# Patient Record
Sex: FEMALE | URBAN - METROPOLITAN AREA
[De-identification: names, ages, dates, MRNs, and addresses within clinical notes are randomized per-mention and may not be internally consistent; named-entity substitution may affect disease eponyms.]

---

## 2018-09-13 ENCOUNTER — APPOINTMENT (RX ONLY)
Dept: URBAN - METROPOLITAN AREA CLINIC 123 | Facility: CLINIC | Age: 28
Setting detail: DERMATOLOGY
End: 2018-09-13

## 2018-09-13 DIAGNOSIS — L20.89 OTHER ATOPIC DERMATITIS: ICD-10-CM

## 2018-09-13 PROBLEM — L20.84 INTRINSIC (ALLERGIC) ECZEMA: Status: ACTIVE | Noted: 2018-09-13

## 2018-09-13 PROBLEM — F32.9 MAJOR DEPRESSIVE DISORDER, SINGLE EPISODE, UNSPECIFIED: Status: ACTIVE | Noted: 2018-09-13

## 2018-09-13 PROCEDURE — 99214 OFFICE O/P EST MOD 30 MIN: CPT

## 2018-09-13 PROCEDURE — ? ADDITIONAL NOTES

## 2018-09-13 PROCEDURE — ? PRESCRIPTION

## 2018-09-13 PROCEDURE — ? COUNSELING

## 2018-09-13 RX ORDER — CLOBETASOL PROPIONATE 0.5 MG/G
AEROSOL, FOAM TOPICAL
Qty: 1 | Refills: 2 | Status: ERX | COMMUNITY
Start: 2018-09-13

## 2018-09-13 RX ORDER — PREDNISONE 10 MG/1
TABLET ORAL
Qty: 12 | Refills: 0 | Status: ERX | COMMUNITY
Start: 2018-09-13

## 2018-09-13 RX ADMIN — CLOBETASOL PROPIONATE: 0.5 AEROSOL, FOAM TOPICAL at 14:01

## 2018-09-13 RX ADMIN — PREDNISONE: 10 TABLET ORAL at 13:58

## 2018-09-13 NOTE — PROCEDURE: ADDITIONAL NOTES
Additional Notes: When finishes prednisone can take Allegra q.a.m. if necessary\\n(+) hx of sensitive skin.\\nCerave lotion samples. Stay bland with products.\\n[employer] Fink info for future in case wants to identify triggers.\\nRight thigh with isolated scaly plaque. Rest of clinical presentation does not appear to be MA.\\nRTO if persists/problematic after tx for bx. Additional Notes: When finishes prednisone can take Allegra q.a.m. if necessary\\n(+) hx of sensitive skin.\\nCerave lotion samples. Stay bland with products.\\n[employer] Fink info for future in case wants to identify triggers.\\nRight thigh with isolated scaly plaque. Rest of clinical presentation does not appear to be AK.\\nRTO if persists/problematic after tx for bx.

## 2018-09-13 NOTE — HPI: RASH
How Severe Is Your Rash?: severe
Is This A New Presentation, Or A Follow-Up?: Rash
Additional History: Went to primary took Medro Pack x2 days didn’t feel it was helping. Also used clobetasol ointment , stopped using cause she was sweating at night and felt sweat wasn’t good.

## 2021-05-16 ENCOUNTER — WALK IN (OUTPATIENT)
Dept: URGENT CARE | Age: 31
End: 2021-05-16

## 2021-05-16 VITALS
TEMPERATURE: 97.9 F | SYSTOLIC BLOOD PRESSURE: 92 MMHG | HEART RATE: 70 BPM | WEIGHT: 120 LBS | DIASTOLIC BLOOD PRESSURE: 50 MMHG | OXYGEN SATURATION: 97 %

## 2021-05-16 DIAGNOSIS — H61.22 IMPACTED CERUMEN OF LEFT EAR: Primary | ICD-10-CM

## 2021-05-16 PROCEDURE — 69210 REMOVE IMPACTED EAR WAX UNI: CPT | Performed by: PHYSICIAN ASSISTANT
